# Patient Record
Sex: MALE | Race: WHITE | Employment: FULL TIME | URBAN - METROPOLITAN AREA
[De-identification: names, ages, dates, MRNs, and addresses within clinical notes are randomized per-mention and may not be internally consistent; named-entity substitution may affect disease eponyms.]

---

## 2019-02-15 ENCOUNTER — OFFICE VISIT (OUTPATIENT)
Dept: URGENT CARE | Facility: RETAIL CLINIC | Age: 39
End: 2019-02-15
Payer: COMMERCIAL

## 2019-02-15 VITALS — DIASTOLIC BLOOD PRESSURE: 74 MMHG | SYSTOLIC BLOOD PRESSURE: 117 MMHG | HEART RATE: 75 BPM | TEMPERATURE: 98 F

## 2019-02-15 DIAGNOSIS — H92.01 RIGHT EAR PAIN: Primary | ICD-10-CM

## 2019-02-15 PROCEDURE — 99213 OFFICE O/P EST LOW 20 MIN: CPT | Performed by: INTERNAL MEDICINE

## 2019-02-15 RX ORDER — AMOXICILLIN 500 MG/1
500 CAPSULE ORAL 3 TIMES DAILY
Qty: 30 CAPSULE | Refills: 0 | Status: SHIPPED | OUTPATIENT
Start: 2019-02-15 | End: 2019-02-15

## 2019-02-15 RX ORDER — CIPROFLOXACIN AND DEXAMETHASONE 3; 1 MG/ML; MG/ML
4 SUSPENSION/ DROPS AURICULAR (OTIC) 2 TIMES DAILY
COMMUNITY
End: 2019-07-30

## 2019-02-15 RX ORDER — AMOXICILLIN 500 MG/1
500 CAPSULE ORAL 3 TIMES DAILY
Qty: 30 CAPSULE | Refills: 0 | Status: SHIPPED | OUTPATIENT
Start: 2019-02-15 | End: 2019-07-30

## 2019-02-15 NOTE — PROGRESS NOTES
[facility] Care Progress Note        Toña Smith MD, MPH  02/15/2019        History:      Thiago Gardiner is a pleasant 38 year old year old male with a chief complaint of right ear pain x 2 days. He has been using ciprodex that he had at home recommended by his mother which does not seem to improve his symptoms. No drainage from his right ear is referred.  No fever or chills.   No dyspnea or wheezing.   No smoking history.   No headache or neck pain.  No GI or  symptoms.   No MSK symptoms.         Assessment and Plan:         Acute right otitis media:  - amoxicillin (AMOXIL) 500 MG capsule; Take 1 capsule (500 mg) by mouth 3 times daily for 10 days  Dispense: 30 capsule; Refill: 0    Discussed supportive care with the patient  Advised to increase fluid intake and rest.  Tylenol 650 mg po for pain q 6 hours prn  F/u w PCP in 4-5 days, earlier if symptoms worsen.                   Physical Exam:      /74   Pulse 75   Temp 98  F (36.7  C) (Oral)      Constitutional: Patient is in no distress The patient is pleasant and cooperative.   HEENT: Head:  Head is atraumatic, normocephalic.    Eyes: Pupils are equal, round and reactive to light and accomodation.  Sclera is non-icteric. No conjunctival injection, or exudate noted. Extraocular motion is intact. Visual acuity is intact bilaterally.  Ears:  External acoustic canals are patent and clear.There is erythema and bulging( exudate)  of the ( R ) tympanic membrane. No pain upon gentle traction on right tragus/pinna. No swelling or pain of the mastoid processes.   Nose:  Nasal congestion w/o drainage or mucosal ulceration is noted.  Throat:  Oral mucosa is moist.  No oral lesions are noted. Posterior pharyngeal hyperemia w/o exudate noted.     Neck Supple.  There is no cervical lymphadenopathy.  No nuchal rigidity noted.  There is no meningismus.     Cardiovascular: Heart is regular to rate and rhythm.  No murmur is noted.     Lungs: Clear  in the anterior and posterior pulmonary fields.   Abdomen: Soft and non-tender.    Back No flank tenderness is noted.   Extremeties No edema, no calf tenderness.   Neuro: No focal deficit.   Skin No petechiae or purpura is noted.  There is no rash.   Mood Normal              Data:      All new lab and imaging data was reviewed.   Results for orders placed or performed during the hospital encounter of 07/10/13   Chest XR,  PA & LAT    Narrative       CHEST 2 VIEW* 7/11/2013 1:11 AM     HISTORY:  chest pain.       Impression    IMPRESSION: Negative.     RUDOLPH BARRON MD   CBC with platelets differential   Result Value Ref Range    WBC 8.2 4.0 - 11.0 10e9/L    RBC Count 5.33 4.4 - 5.9 10e12/L    Hemoglobin 15.0 13.3 - 17.7 g/dL    Hematocrit 44.8 40.0 - 53.0 %    MCV 84 78 - 100 fl    MCH 28.1 26.5 - 33.0 pg    MCHC 33.5 31.5 - 36.5 g/dL    RDW 13.8 10.0 - 15.0 %    Platelet Count 246 150 - 450 10e9/L    Diff Method Automated Method     % Neutrophils 63.3 %    % Lymphocytes 26.6 %    % Monocytes 7.9 %    % Eosinophils 1.9 %    % Basophils 0.2 %    % Immature Granulocytes 0.1 %    Absolute Neutrophil 5.2 1.6 - 8.3 10e9/L    Absolute Lymphocytes 2.2 0.8 - 5.3 10e9/L    Absolute Monocytes 0.7 0.0 - 1.3 10e9/L    Absolute Eosinophils 0.2 0.0 - 0.7 10e9/L    Absolute Basophils 0.0 0.0 - 0.2 10e9/L    Abs Immature Granulocytes 0.0 0 - 0.4 10e9/L   Basic metabolic panel   Result Value Ref Range    Sodium 143 133 - 144 mmol/L    Potassium 3.7 3.4 - 5.3 mmol/L    Chloride 104 94 - 109 mmol/L    Carbon Dioxide 26 20 - 32 mmol/L    Anion Gap 13.2 6 - 17 mmol/L    Glucose 99 60 - 99 mg/dL    Urea Nitrogen 12 5 - 24 mg/dL    Creatinine 0.89 0.66 - 1.25 mg/dL    GFR Estimate >90 >60 mL/min/1.7m2    GFR Estimate If Black >90 >60 mL/min/1.7m2    Calcium 9.2 8.5 - 10.4 mg/dL   Troponin I   Result Value Ref Range    Troponin I ES <0.012 0.000 - 0.034 ug/L

## 2019-07-30 ENCOUNTER — OFFICE VISIT (OUTPATIENT)
Dept: URGENT CARE | Facility: RETAIL CLINIC | Age: 39
End: 2019-07-30
Payer: COMMERCIAL

## 2019-07-30 VITALS
DIASTOLIC BLOOD PRESSURE: 85 MMHG | OXYGEN SATURATION: 97 % | TEMPERATURE: 98.3 F | HEART RATE: 65 BPM | SYSTOLIC BLOOD PRESSURE: 130 MMHG

## 2019-07-30 DIAGNOSIS — H66.002 NON-RECURRENT ACUTE SUPPURATIVE OTITIS MEDIA OF LEFT EAR WITHOUT SPONTANEOUS RUPTURE OF TYMPANIC MEMBRANE: Primary | ICD-10-CM

## 2019-07-30 DIAGNOSIS — H60.392 INFECTIVE OTITIS EXTERNA, LEFT: ICD-10-CM

## 2019-07-30 PROCEDURE — 99213 OFFICE O/P EST LOW 20 MIN: CPT | Performed by: NURSE PRACTITIONER

## 2019-07-30 RX ORDER — AMOXICILLIN 875 MG
875 TABLET ORAL 2 TIMES DAILY
Qty: 14 TABLET | Refills: 0 | Status: SHIPPED | OUTPATIENT
Start: 2019-07-30 | End: 2019-08-06

## 2019-07-30 RX ORDER — NEOMYCIN POLYMYXIN B SULFATES AND DEXAMETHASONE 3.5; 10000; 1 MG/ML; [USP'U]/ML; MG/ML
SUSPENSION/ DROPS OPHTHALMIC
Qty: 5 ML | Refills: 0 | Status: SHIPPED | OUTPATIENT
Start: 2019-07-30

## 2019-07-30 ASSESSMENT — ENCOUNTER SYMPTOMS
DIZZINESS: 0
DIAPHORESIS: 0
FATIGUE: 0
APPETITE CHANGE: 0
ACTIVITY CHANGE: 0
HEADACHES: 0
ADENOPATHY: 0
FEVER: 0
LIGHT-HEADEDNESS: 0
WEAKNESS: 0
SINUS PAIN: 0
SINUS PRESSURE: 0
CHILLS: 0

## 2019-07-30 NOTE — PATIENT INSTRUCTIONS
Take antibiotic as directed.  No drops in right ear with perforation.  Tylenol and/or motrin for pain relief and fever reduction.  Warm compresses next to ear for pain relief.  Do not submerge head under water for one week.  Ear may continue to drain for several days.  Do not insert anything into the ear- may use q-tip to clean the outer ear.    Follow up with primary care provider in 10-14 days if any concern of persistent infection.  ENT for reevaluation of right tympanic membrane perforation.    Patient Education     Ruptured Eardrum, Traumatic    The eardrum is a thin membrane about 1 inch from the opening of the ear canal. It is easily injured by objects put into the ear canal. It may also be injured by a loud noise close to the ear or a slap to the ear. A ruptured eardrum will cause pain. There may be some clear or bloody drainage. A buzzing sound may be heard in the ear. Some hearing may be lost the affected ear.  The goal is to keep the ear dry and clean until the eardrum heals. Antibiotics may be prescribed if infection is present. Follow-up with ear and hearing specialists is advised. In many cases, hearing returns to normal after the eardrum heals.  Home care    Keep a cotton ball in the ear to keep it clean and protect the inner ear.    Do not use eardrops unless prescribed by the healthcare provider.    Do not get water in your ear when showering or bathing. No swimming until approved by the healthcare provider.    Take any prescription or over-the-counter medicines as advised.  Follow-up care  Follow up with specialists for test or exams as directed. A hearing test should be done soon after the injury. Also follow up within 2 weeks to check healing of the eardrum.  When to seek medical advice  Fever in children:    Child is 3 months old or younger and has a fever of 100.4 F (38 C) or higher. (Get medical care right away. Fever in a young baby can be a sign of a dangerous infection.)    Child is younger  than 2 years of age and has a fever of 100.4 F (38 C) that continues for more than 1 day.    Child is 2 years old or older and has a fever of 100.4 F (38 C) that continues for more than 3 days.    Child is of any age and has repeated fevers above 104 F (40 C).  Fever in adults:    Fever of 100.4 F (38 C)  Also call for any of the following:    Fluid drainage from the ear for longer than 24 hours    Increasing ear pain    Worsening headache or dizziness    Worsening hearing loss  Date Last Reviewed: 10/1/2017    1515-9958 [employer]. [address] [place], [place] [id number]. All rights reserved. This information is not intended as a substitute for professional medical care. Always follow your healthcare professional's instructions.
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
## 2021-01-11 ENCOUNTER — VIRTUAL VISIT (OUTPATIENT)
Dept: FAMILY MEDICINE | Facility: OTHER | Age: 41
End: 2021-01-11

## 2021-01-12 NOTE — PROGRESS NOTES
"Date: 2021 18:12:28  Clinician: Carlos Koehler  Clinician NPI: [id number]  Patient: Thiago Gardiner  Patient : 1980  Patient Address: [address] [place] [address]  Patient Phone: [phone]  Visit Protocol: Ear pain  Patient Summary:  Thiago is a 40 year old ( : 1980 ) male who initiated a [facility] Visit for swimmer's ear (outer ear infection). When asked the question \"Please sign me up to receive news, health information and promotions from [facility].\", Thiago responded \"No\".    Thiago reports that his ear pain started 5-7 days ago. The ear pain is located outside (external surface) and inside the left ear.   In addition to the ear pain, Thiago is experiencing tenderness and redness in the ear(s). His ear(s) is tender to the touch on the ear canal. Thiago reports having fluid draining from the ear(s).   Symptom Details     Pain: Thiago is experiencing moderate pain (4-6 on a 10 point pain scale). It gets worse when he eats or chews and gently pulls on the earlobe(s).     Drainage: The color of the fluid coming out of his ear(s) is yellow. The fluid does not have a bad odor.      Thiago denies itchiness and a feeling of fullness in the ear(s). He also denies the possibility of a foreign object in the ear(s), recent injuries near the ear(s), and feeling feverish.   Precipitating events   Thiago denies flying and swimming within the past week.   Pertinent medical history  He has had tubes placed in his ear(s) to drain fluid he which are no longer in place.   Thiago denies having immunosuppressive conditions (e.g., chemotherapy, HIV, organ transplant, long-term use of steroids or other immunosuppressive medications, splenectomy).   Thiago does not have diabetes.   Weight: 160 lbs   Thiago smokes or uses smokeless tobacco.   Weight: 160 lbs    MEDICATIONS: No current medications, ALLERGIES: NKDA  Clinician Response:  Dear Thiago,   Based upon the information you provided, you may have otitis externa. External " otitis, also known as swimmer's ear, is a condition that occurs when the ear canal becomes irritated or infected.   Medication information  I am prescribing:     Ofloxacin (Floxin) 0.3% otic ear drops. Instill 5 drops into affected ear(s) 1 time per day for 7 days. There are no refills with this prescription.   Instructions for using eardrops:     Lie on your side or tilt your head    Insert the drops into your ear canal    Lie on your side or insert a cotton ball in the ear canal for 5 minutes    Use the medication for the number of days recommended, even if you begin to feel better within a few days after starting the drops     Self care  Avoid getting water inside your ear while you are being treated for swimmer's ear.  Use a cotton ball coated with petroleum jelly to protect your ears when bathing and showering.  Do not go swimming or diving for the next 7-10 days.  Do not wear headphones or hearing aid in the infected ears until your symptoms improve.  Becoming tobacco-free is one of the best things you can do to improve your health. For help with quitting tobacco, you can call 8-234MySocialNightlifeQUIT-NOW (1-792.652.1226) or visit smokefree.[employer].  When to seek care  Please make an appointment to be seen in a clinic or urgent care if any of the following occur:     Symptoms do not improve within 2 days    New symptoms develop or symptoms becomes worse      Diagnosis: Otitis externa  Diagnosis ICD: H60.399  Prescription: ofloxacin (Floxin) 0.3 % otic (ear) drops 1 10 ml dropper bottle, 7 days supply. Instill 5 drops into affected ear(s) 1 time per day for 7 days. Refills: 0, Refill as needed: no, Allow substitutions: yes  Pharmacy: [facility] - [phone] - [address] [facility] [place] [address]